# Patient Record
Sex: MALE | URBAN - METROPOLITAN AREA
[De-identification: names, ages, dates, MRNs, and addresses within clinical notes are randomized per-mention and may not be internally consistent; named-entity substitution may affect disease eponyms.]

---

## 2021-02-25 ENCOUNTER — APPOINTMENT (RX ONLY)
Dept: URBAN - METROPOLITAN AREA CLINIC 12 | Facility: CLINIC | Age: 41
Setting detail: DERMATOLOGY
End: 2021-02-25

## 2021-02-25 VITALS — TEMPERATURE: 97.3 F

## 2021-02-25 DIAGNOSIS — B07.0 PLANTAR WART: ICD-10-CM | Status: INADEQUATELY CONTROLLED

## 2021-02-25 PROCEDURE — 17111 DESTRUCTION B9 LESIONS 15/>: CPT

## 2021-02-25 PROCEDURE — ? TREATMENT REGIMEN

## 2021-02-25 PROCEDURE — ? COUNSELING

## 2021-02-25 PROCEDURE — ? PRESCRIPTION

## 2021-02-25 PROCEDURE — ? LIQUID NITROGEN

## 2021-02-25 ASSESSMENT — LOCATION SIMPLE DESCRIPTION DERM
LOCATION SIMPLE: RIGHT PLANTAR SURFACE
LOCATION SIMPLE: LEFT PLANTAR SURFACE

## 2021-02-25 ASSESSMENT — LOCATION DETAILED DESCRIPTION DERM
LOCATION DETAILED: LEFT PLANTAR FOREFOOT OVERLYING 4TH METATARSAL
LOCATION DETAILED: LEFT PLANTAR FOREFOOT OVERLYING 3RD METATARSAL
LOCATION DETAILED: LEFT PLANTAR FOREFOOT OVERLYING 2ND METATARSAL
LOCATION DETAILED: RIGHT PLANTAR FOREFOOT OVERLYING 1ST METATARSAL
LOCATION DETAILED: LEFT PLANTAR FOREFOOT OVERLYING 5TH METATARSAL
LOCATION DETAILED: RIGHT PLANTAR FOREFOOT OVERLYING 5TH METATARSAL

## 2021-02-25 ASSESSMENT — TOTAL NUMBER OF LESIONS: # OF LESIONS?: 17

## 2021-02-25 ASSESSMENT — LOCATION ZONE DERM: LOCATION ZONE: FEET

## 2021-02-25 NOTE — PROCEDURE: TREATMENT REGIMEN
Initiate Treatment: After 3 days, start wartPeel (soaks feet, apply wart peel, and cover with bandage qhs)
Detail Level: Simple
Plan: Follow up in 6 weeks

## 2021-02-25 NOTE — PROCEDURE: LIQUID NITROGEN
Render Post-Care Instructions In Note?: yes
Render Note In Bullet Format When Appropriate: No
Detail Level: Detailed
Medical Necessity Information: It is in your best interest to select a reason for this procedure from the list below. All of these items fulfill various CMS LCD requirements except the new and changing color options.
Medical Necessity Clause: This procedure was medically necessary because the lesions that were treated were:
Duration Of Freeze Thaw-Cycle (Seconds): 5
Number Of Freeze-Thaw Cycles: 4 freeze-thaw cycles

## 2021-04-08 ENCOUNTER — APPOINTMENT (RX ONLY)
Dept: URBAN - METROPOLITAN AREA CLINIC 12 | Facility: CLINIC | Age: 41
Setting detail: DERMATOLOGY
End: 2021-04-08

## 2021-04-08 VITALS — TEMPERATURE: 98.1 F

## 2021-04-08 DIAGNOSIS — I83.9 ASYMPTOMATIC VARICOSE VEINS OF LOWER EXTREMITIES: ICD-10-CM

## 2021-04-08 DIAGNOSIS — B07.0 PLANTAR WART: ICD-10-CM | Status: IMPROVED

## 2021-04-08 PROBLEM — I83.93 ASYMPTOMATIC VARICOSE VEINS OF BILATERAL LOWER EXTREMITIES: Status: ACTIVE | Noted: 2021-04-08

## 2021-04-08 PROCEDURE — 99212 OFFICE O/P EST SF 10 MIN: CPT | Mod: 25

## 2021-04-08 PROCEDURE — ? RECOMMENDATIONS

## 2021-04-08 PROCEDURE — ? TREATMENT REGIMEN

## 2021-04-08 PROCEDURE — ? LIQUID NITROGEN

## 2021-04-08 PROCEDURE — ? COUNSELING

## 2021-04-08 PROCEDURE — 17110 DESTRUCTION B9 LES UP TO 14: CPT

## 2021-04-08 ASSESSMENT — TOTAL NUMBER OF LESIONS: # OF LESIONS?: 12

## 2021-04-08 ASSESSMENT — LOCATION DETAILED DESCRIPTION DERM
LOCATION DETAILED: LEFT PLANTAR FOREFOOT OVERLYING 5TH METATARSAL
LOCATION DETAILED: RIGHT PLANTAR FOREFOOT OVERLYING 5TH METATARSAL
LOCATION DETAILED: RIGHT DISTAL PRETIBIAL REGION
LOCATION DETAILED: LEFT PLANTAR FOREFOOT OVERLYING 2ND METATARSAL
LOCATION DETAILED: LEFT PLANTAR FOREFOOT OVERLYING 4TH METATARSAL
LOCATION DETAILED: LEFT DISTAL PRETIBIAL REGION
LOCATION DETAILED: LEFT LATERAL PLANTAR MIDFOOT

## 2021-04-08 ASSESSMENT — LOCATION SIMPLE DESCRIPTION DERM
LOCATION SIMPLE: RIGHT PLANTAR SURFACE
LOCATION SIMPLE: LEFT PRETIBIAL REGION
LOCATION SIMPLE: LEFT PLANTAR SURFACE
LOCATION SIMPLE: RIGHT PRETIBIAL REGION

## 2021-04-08 ASSESSMENT — LOCATION ZONE DERM
LOCATION ZONE: LEG
LOCATION ZONE: FEET

## 2021-04-08 NOTE — PROCEDURE: LIQUID NITROGEN
Add 52 Modifier (Optional): no
Detail Level: Detailed
Duration Of Freeze Thaw-Cycle (Seconds): 5
Number Of Freeze-Thaw Cycles: 1 freeze-thaw cycle
Render Post-Care Instructions In Note?: yes
Medical Necessity Clause: This procedure was medically necessary because the lesions that were treated were:
Medical Necessity Information: It is in your best interest to select a reason for this procedure from the list below. All of these items fulfill various CMS LCD requirements except the new and changing color options.

## 2021-05-13 ENCOUNTER — APPOINTMENT (RX ONLY)
Dept: URBAN - METROPOLITAN AREA CLINIC 12 | Facility: CLINIC | Age: 41
Setting detail: DERMATOLOGY
End: 2021-05-13

## 2021-05-13 DIAGNOSIS — L73.8 OTHER SPECIFIED FOLLICULAR DISORDERS: ICD-10-CM

## 2021-05-13 DIAGNOSIS — D22 MELANOCYTIC NEVI: ICD-10-CM

## 2021-05-13 DIAGNOSIS — B07.0 PLANTAR WART: ICD-10-CM | Status: IMPROVED

## 2021-05-13 DIAGNOSIS — Z71.89 OTHER SPECIFIED COUNSELING: ICD-10-CM

## 2021-05-13 DIAGNOSIS — D485 NEOPLASM OF UNCERTAIN BEHAVIOR OF SKIN: ICD-10-CM

## 2021-05-13 DIAGNOSIS — D18.0 HEMANGIOMA: ICD-10-CM

## 2021-05-13 PROBLEM — D48.5 NEOPLASM OF UNCERTAIN BEHAVIOR OF SKIN: Status: ACTIVE | Noted: 2021-05-13

## 2021-05-13 PROBLEM — D22.5 MELANOCYTIC NEVI OF TRUNK: Status: ACTIVE | Noted: 2021-05-13

## 2021-05-13 PROBLEM — D18.01 HEMANGIOMA OF SKIN AND SUBCUTANEOUS TISSUE: Status: ACTIVE | Noted: 2021-05-13

## 2021-05-13 PROCEDURE — ? SUNSCREEN RECOMMENDATIONS

## 2021-05-13 PROCEDURE — ? LIQUID NITROGEN

## 2021-05-13 PROCEDURE — 99213 OFFICE O/P EST LOW 20 MIN: CPT | Mod: 25

## 2021-05-13 PROCEDURE — ? TREATMENT REGIMEN

## 2021-05-13 PROCEDURE — 17110 DESTRUCTION B9 LES UP TO 14: CPT

## 2021-05-13 PROCEDURE — ? COUNSELING

## 2021-05-13 PROCEDURE — ? DERMTECH: PLA - PIGMENTED LESION ASSAY

## 2021-05-13 ASSESSMENT — LOCATION DETAILED DESCRIPTION DERM
LOCATION DETAILED: LEFT AXILLARY VAULT
LOCATION DETAILED: RIGHT SUPERIOR UPPER BACK
LOCATION DETAILED: SUPERIOR LUMBAR SPINE
LOCATION DETAILED: RIGHT PLANTAR FOREFOOT OVERLYING 5TH METATARSAL
LOCATION DETAILED: LEFT PLANTAR FOREFOOT OVERLYING 4TH METATARSAL
LOCATION DETAILED: RIGHT LATERAL PLANTAR MIDFOOT
LOCATION DETAILED: EPIGASTRIC SKIN
LOCATION DETAILED: INFERIOR THORACIC SPINE

## 2021-05-13 ASSESSMENT — LOCATION SIMPLE DESCRIPTION DERM
LOCATION SIMPLE: LEFT PLANTAR SURFACE
LOCATION SIMPLE: UPPER BACK
LOCATION SIMPLE: RIGHT UPPER BACK
LOCATION SIMPLE: RIGHT PLANTAR SURFACE
LOCATION SIMPLE: ABDOMEN
LOCATION SIMPLE: LOWER BACK
LOCATION SIMPLE: LEFT AXILLARY VAULT

## 2021-05-13 ASSESSMENT — AREA OF WARTS IN CM2: TOTAL AREA OF ALL WARTS IN CM2: 1

## 2021-05-13 ASSESSMENT — TOTAL NUMBER OF LESIONS: # OF LESIONS?: 2

## 2021-05-13 ASSESSMENT — LOCATION ZONE DERM
LOCATION ZONE: AXILLAE
LOCATION ZONE: TRUNK
LOCATION ZONE: FEET

## 2021-05-13 NOTE — PROCEDURE: [facility]: PLA - PIGMENTED LESION ASSAY
Detail Level: Detailed
Size Of Lesion In Cm (Optional): 0.8
X Size Of Lesion In Cm (Optional): 0
Biopsy Type: RNA evaluation
Lab: [id number]
Procedure Details: The first patch was applied to the lesion, adhesive side down, and rubbed in a circular motion 15 times.  Following this the lesion was outlined with a permanent marker and the patch was then secured, adhesive side down, in the provided transport case.  This process was repeated for patches 2 through 4 and then sent to [facility] in the provided self-addressed envelope.
Render Path Notes In Note?: No
Consent: Written consent was obtained and risks were reviewed including but not limited to mild pain and skin irritation.
Render Post-Care Instructions In Note?: yes
Post-Care Instructions: I reviewed with the patient in detail post-care instructions.
Notification Instructions: Patient will be notified of biopsy results. However, patient instructed to call the office if not contacted within 2 weeks.
Billing Type: Third-Party Bill

## 2021-05-13 NOTE — PROCEDURE: REASSURANCE
Detail Level: Detailed
Hide Additional Notes?: No
Additional Notes (Optional): Give it time to go away

## 2021-05-13 NOTE — PROCEDURE: LIQUID NITROGEN
Render Post-Care Instructions In Note?: yes
Medical Necessity Information: It is in your best interest to select a reason for this procedure from the list below. All of these items fulfill various CMS LCD requirements except the new and changing color options.
Detail Level: Detailed
Medical Necessity Clause: This procedure was medically necessary because the lesions that were treated were:
Number Of Freeze-Thaw Cycles: 2 freeze-thaw cycles
Include Z78.9 (Other Specified Conditions Influencing Health Status) As An Associated Diagnosis?: No
Duration Of Freeze Thaw-Cycle (Seconds): 5

## 2022-08-01 ENCOUNTER — APPOINTMENT (RX ONLY)
Dept: URBAN - METROPOLITAN AREA CLINIC 12 | Facility: CLINIC | Age: 42
Setting detail: DERMATOLOGY
End: 2022-08-01

## 2022-08-01 DIAGNOSIS — Z71.89 OTHER SPECIFIED COUNSELING: ICD-10-CM

## 2022-08-01 DIAGNOSIS — L82.1 OTHER SEBORRHEIC KERATOSIS: ICD-10-CM

## 2022-08-01 DIAGNOSIS — D18.0 HEMANGIOMA: ICD-10-CM

## 2022-08-01 DIAGNOSIS — L81.4 OTHER MELANIN HYPERPIGMENTATION: ICD-10-CM

## 2022-08-01 DIAGNOSIS — D22 MELANOCYTIC NEVI: ICD-10-CM

## 2022-08-01 PROBLEM — D18.01 HEMANGIOMA OF SKIN AND SUBCUTANEOUS TISSUE: Status: ACTIVE | Noted: 2022-08-01

## 2022-08-01 PROBLEM — D22.5 MELANOCYTIC NEVI OF TRUNK: Status: ACTIVE | Noted: 2022-08-01

## 2022-08-01 PROCEDURE — ? DEFER

## 2022-08-01 PROCEDURE — 99213 OFFICE O/P EST LOW 20 MIN: CPT

## 2022-08-01 PROCEDURE — ? COUNSELING

## 2022-08-01 ASSESSMENT — LOCATION ZONE DERM
LOCATION ZONE: ARM
LOCATION ZONE: TRUNK

## 2022-08-01 ASSESSMENT — LOCATION SIMPLE DESCRIPTION DERM
LOCATION SIMPLE: RIGHT SHOULDER
LOCATION SIMPLE: LEFT FOREARM
LOCATION SIMPLE: UPPER BACK
LOCATION SIMPLE: LEFT SHOULDER
LOCATION SIMPLE: CHEST
LOCATION SIMPLE: ABDOMEN
LOCATION SIMPLE: RIGHT FOREARM

## 2022-08-01 ASSESSMENT — LOCATION DETAILED DESCRIPTION DERM
LOCATION DETAILED: RIGHT POSTERIOR SHOULDER
LOCATION DETAILED: SUBXIPHOID
LOCATION DETAILED: UPPER STERNUM
LOCATION DETAILED: INFERIOR THORACIC SPINE
LOCATION DETAILED: EPIGASTRIC SKIN
LOCATION DETAILED: LEFT POSTERIOR SHOULDER
LOCATION DETAILED: LEFT PROXIMAL DORSAL FOREARM
LOCATION DETAILED: RIGHT PROXIMAL DORSAL FOREARM

## 2022-08-01 ASSESSMENT — PAIN INTENSITY VAS: HOW INTENSE IS YOUR PAIN 0 BEING NO PAIN, 10 BEING THE MOST SEVERE PAIN POSSIBLE?: NO PAIN

## 2022-08-01 NOTE — PROCEDURE: DEFER
Detail Level: Simple
Size Of Lesion In Cm (Optional): 0
Procedure To Be Performed At Next Visit: Cautery
Introduction Text (Please End With A Colon): The following procedure was deferred:  Excision.
Instructions (Optional): Cosmetic $150 charge, patient aware